# Patient Record
Sex: FEMALE | Race: WHITE | NOT HISPANIC OR LATINO | Employment: FULL TIME | URBAN - METROPOLITAN AREA
[De-identification: names, ages, dates, MRNs, and addresses within clinical notes are randomized per-mention and may not be internally consistent; named-entity substitution may affect disease eponyms.]

---

## 2017-01-03 RX ORDER — TETRACYCLINE HYDROCHLORIDE 500 MG/1
CAPSULE ORAL
Qty: 30 CAPSULE | Refills: 0 | Status: SHIPPED | OUTPATIENT
Start: 2017-01-03 | End: 2017-03-24 | Stop reason: SDUPTHER

## 2017-03-27 RX ORDER — TETRACYCLINE HYDROCHLORIDE 500 MG/1
CAPSULE ORAL
Qty: 30 CAPSULE | Refills: 0 | Status: SHIPPED | OUTPATIENT
Start: 2017-03-27 | End: 2017-05-09

## 2017-05-09 ENCOUNTER — OFFICE VISIT (OUTPATIENT)
Dept: FAMILY MEDICINE | Facility: HOSPITAL | Age: 32
End: 2017-05-09
Attending: FAMILY MEDICINE
Payer: MEDICAID

## 2017-05-09 VITALS
BODY MASS INDEX: 27.1 KG/M2 | SYSTOLIC BLOOD PRESSURE: 117 MMHG | WEIGHT: 147.25 LBS | DIASTOLIC BLOOD PRESSURE: 70 MMHG | HEART RATE: 75 BPM | HEIGHT: 62 IN

## 2017-05-09 DIAGNOSIS — Z79.899 ENCOUNTER FOR LONG-TERM (CURRENT) USE OF MEDICATIONS: ICD-10-CM

## 2017-05-09 DIAGNOSIS — Z83.3 FAMILY HISTORY OF DIABETES MELLITUS: ICD-10-CM

## 2017-05-09 DIAGNOSIS — L70.0 ACNE VULGARIS: Primary | ICD-10-CM

## 2017-05-09 DIAGNOSIS — Z82.3 FAMILY HISTORY OF STROKE: ICD-10-CM

## 2017-05-09 PROCEDURE — 99213 OFFICE O/P EST LOW 20 MIN: CPT | Performed by: FAMILY MEDICINE

## 2017-05-09 RX ORDER — TRETINOIN 0.25 MG/G
GEL TOPICAL DAILY
Qty: 45 G | Refills: 11 | Status: SHIPPED | OUTPATIENT
Start: 2017-05-09

## 2017-05-09 RX ORDER — TETRACYCLINE HYDROCHLORIDE 500 MG/1
500 CAPSULE ORAL 2 TIMES DAILY
Qty: 62 CAPSULE | Refills: 6 | Status: SHIPPED | OUTPATIENT
Start: 2017-05-09

## 2017-05-09 NOTE — PROGRESS NOTES
Patient ID: Oneyda Ohara is a 31 y.o. female.    Chief Complaint: Medication Refill      HPI: Ms. Ohara is a 31 y.o. Female with a PMH of acne, anxiety, and bipolar disorder presenting for acne follow up and refill. She has been out of acne medication for 2 months, patient uses tretinoin gel and tetracycline gel and she reports breakouts since off medications. Patient recently moved back to the area and wants to re-establish care. She reports history of reactive airway disease and does not have albuterol inhaler. She denies SOB, has not been to the ER for asthma exacerbation. Patient does not take medications for bipolar disorder. Reports occasional dysthymia, denies SI/HI, she is able to complete her daily activities.        Social History     Social History    Marital status: Single     Spouse name: N/A    Number of children: N/A    Years of education: N/A     Social History Main Topics    Smoking status: Current Every Day Smoker     Packs/day: 0.50     Types: Cigarettes    Smokeless tobacco: None      Comment: quit due to pregnancy    Alcohol use No      Comment: once a month    Drug use: No      Comment: not currently    Sexual activity: Yes     Partners: Male     Other Topics Concern    None     Social History Narrative       Review of patient's allergies indicates:   Allergen Reactions    Sulfa (sulfonamide antibiotics) Swelling       Current Outpatient Prescriptions   Medication Sig Dispense Refill    tretinoin (RETIN-A) 0.025 % gel Apply topically once daily. Avoid direct sun. May cause dryness , redness.  May need to space every other or 3rd day 45 g 11    tetracycline (ACHROMYCIN,SUMYCIN) 500 MG capsule Take 1 capsule (500 mg total) by mouth 2 (two) times daily. 62 capsule 6     No current facility-administered medications for this visit.          Chemistry        Component Value Date/Time     05/24/2013 1210    K 4.0 05/24/2013 1210     05/24/2013 1210    CO2 22 (L) 05/24/2013  "1210    BUN 6 05/24/2013 1210    CREATININE 0.7 05/24/2013 1210    GLU 83 05/24/2013 1210        Component Value Date/Time    CALCIUM 9.6 05/24/2013 1210    ALKPHOS 41 (L) 05/24/2013 1210    AST 25 05/24/2013 1210    ALT 34 05/24/2013 1210    BILITOT 0.3 05/24/2013 1210            No results found for: TSH    No results found for: CHOL  No results found for: HDL  No results found for: LDLCALC  No results found for: TRIG  No results found for: CHOLHDL    Review of Systems:    General : No fatigue, fever, unexplained weight loss of gain, dizziness or passing out spells    HEENT:  No blurred vision, decreased vision, eye pain, decreased hearing, headaches,  Nosebleeds, or difficulty swallowing    CARDIAC:  No chest pain, palpitations, decreased exercise tolerance.    PULMONARY: No SOB, FLORES, PND, Othopnea, or lower extremity edema:    GI: No nausea, vomiting, diarrhea, constipation, blood in stool or black stools          :  No dysuria , frequency, urgency, loss of urine, blood in urin                 Female: no unusual vaginal bleeding, discharge, pelvic pain or dyspareunia    MUSC/SKEL: No painful swollen joints, decreased ROM in joints, heat or redness in joints    PSYCHIATRIC:  No sadness, tearfulness, loss of interest in things you use to like to do.  No Insomnia, hypersombulance, thoughts of killing self or others    /70  Pulse 75  Ht 5' 2" (1.575 m)  Wt 66.8 kg (147 lb 4.3 oz)  LMP 05/09/2017  BMI 26.94 kg/m2    Physical Exam:    GENERAL: alert, well nourished, well developed, well groomed, appears stated age, in no acute distress    HEENT: normocephalic, atraumatic, PERRLA, fundi benign, disk sharp, no hemorrhages or exudates EOM intact, no scleral icterus  or conjunctival injection:            EARS: Tympanic membrane intact without injection or scars. Good light reflex and landmarks            NOSE: Turbinates normal, no septal deviation, no obstruction of ostia            MOUTH: (Good oral " hygiene)  Moist mucus membranes Observed, without hyperemia, or exudates    NECK: No JVD, bruits or thyromegaly, or lymphadenopathy    CHEST: Good excursion, no rales, rhonchi or wheezes. Clear to auscultation and percussion bilaterally. Normal shape    HEART: Regular rate and rhythm, no murmurs or gallops, or rubs. PMI 5th  ICS    ABDOMEN: Soft, non-tender, non-rigid. No masses or organomegaly appreciated. Good bowel sounds no fluid wave, no rebound or referred tenderness.    EXTREMITIES: Positive pulses, positive reflexes, positive sensory to filament.  No cyanosis, clubbing or deformity.  No edema or ulcers or skin lesions.    NEURO: alert, responsive, CNII-XII intact grossly, motor and sensory intact. Oriented times 4.    PSYCHIATRIC: Mental status normal, mood and affect normal. No suicidal or homicidal ideation. No previous attempts at suicide.    Assessment/Plan:    Health Care Maintenance:  - pap smear UTD, no history of abnormal pap smears  -influenza: will address at future visit    Acne vulgaris  -     CBC auto differential; Future; Expected date: 5/9/17  -     tretinoin (RETIN-A) 0.025 % gel; Apply topically once daily. Avoid direct sun. May cause dryness , redness.  May need to space every other or 3rd day  Dispense: 45 g; Refill: 11  -     tetracycline (ACHROMYCIN,SUMYCIN) 500 MG capsule; Take 1 capsule (500 mg total) by mouth 2 (two) times daily.  Dispense: 62 capsule; Refill: 6    Encounter for long-term (current) use of medications  -     Comprehensive metabolic panel; Future; Expected date: 5/9/17  -     CBC auto differential; Future; Expected date: 5/9/17    Family history of diabetes mellitus  -     Hemoglobin A1c; Future; Expected date: 5/9/17    Family history of stroke  -     Lipid panel; Future; Expected date: 5/9/17      No Follow-up on file.

## 2017-09-12 ENCOUNTER — HOSPITAL ENCOUNTER (EMERGENCY)
Facility: HOSPITAL | Age: 32
Discharge: HOME | End: 2017-09-12
Attending: EMERGENCY MEDICINE
Payer: MEDICAID

## 2017-09-12 VITALS
TEMPERATURE: 99 F | RESPIRATION RATE: 17 BRPM | SYSTOLIC BLOOD PRESSURE: 112 MMHG | OXYGEN SATURATION: 99 % | BODY MASS INDEX: 23.92 KG/M2 | HEIGHT: 62 IN | HEART RATE: 84 BPM | WEIGHT: 130 LBS | DIASTOLIC BLOOD PRESSURE: 79 MMHG

## 2017-09-12 DIAGNOSIS — S20.211A RIB CONTUSION, RIGHT, INITIAL ENCOUNTER: ICD-10-CM

## 2017-09-12 DIAGNOSIS — S16.1XXA CERVICAL STRAIN, ACUTE, INITIAL ENCOUNTER: ICD-10-CM

## 2017-09-12 DIAGNOSIS — S39.012A LUMBAR STRAIN, INITIAL ENCOUNTER: Primary | ICD-10-CM

## 2017-09-12 DIAGNOSIS — T14.90XA TRAUMA: ICD-10-CM
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
## 2017-09-12 PROCEDURE — 63600175 PHARM REV CODE 636 W HCPCS: Performed by: EMERGENCY MEDICINE

## 2017-09-12 PROCEDURE — 99283 EMERGENCY DEPT VISIT LOW MDM: CPT | Mod: 25

## 2017-09-12 PROCEDURE — 25000003 PHARM REV CODE 250: Performed by: EMERGENCY MEDICINE

## 2017-09-12 PROCEDURE — 96372 THER/PROPH/DIAG INJ SC/IM: CPT

## 2017-09-12 RX ORDER — HYDROCODONE BITARTRATE AND ACETAMINOPHEN 5; 325 MG/1; MG/1
1 TABLET ORAL
Status: COMPLETED | OUTPATIENT
Start: 2017-09-12 | End: 2017-09-12

## 2017-09-12 RX ORDER — NAPROXEN 375 MG/1
375 TABLET ORAL 2 TIMES DAILY WITH MEALS
Qty: 60 TABLET | Refills: 0 | Status: SHIPPED | OUTPATIENT
Start: 2017-09-12 | End: 2018-01-06

## 2017-09-12 RX ORDER — CYCLOBENZAPRINE HCL 10 MG
10 TABLET ORAL
Status: COMPLETED | OUTPATIENT
Start: 2017-09-12 | End: 2017-09-12

## 2017-09-12 RX ORDER — CYCLOBENZAPRINE HCL 10 MG
10 TABLET ORAL 3 TIMES DAILY PRN
Qty: 15 TABLET | Refills: 0 | Status: SHIPPED | OUTPATIENT
Start: 2017-09-12 | End: 2017-09-17

## 2017-09-12 RX ORDER — KETOROLAC TROMETHAMINE 30 MG/ML
30 INJECTION, SOLUTION INTRAMUSCULAR; INTRAVENOUS
Status: COMPLETED | OUTPATIENT
Start: 2017-09-12 | End: 2017-09-12

## 2017-09-12 RX ADMIN — CYCLOBENZAPRINE HYDROCHLORIDE 10 MG: 10 TABLET, FILM COATED ORAL at 09:09

## 2017-09-12 RX ADMIN — KETOROLAC TROMETHAMINE 30 MG: 30 INJECTION, SOLUTION INTRAMUSCULAR at 09:09

## 2017-09-12 RX ADMIN — HYDROCODONE BITARTRATE AND ACETAMINOPHEN 1 TABLET: 5; 325 TABLET ORAL at 09:09
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
## 2018-07-31 ENCOUNTER — HOSPITAL ENCOUNTER (EMERGENCY)
Facility: OTHER | Age: 33
Discharge: HOME | End: 2018-07-31
Attending: EMERGENCY MEDICINE
Payer: MEDICAID

## 2018-07-31 VITALS
BODY MASS INDEX: 25.19 KG/M2 | SYSTOLIC BLOOD PRESSURE: 121 MMHG | DIASTOLIC BLOOD PRESSURE: 70 MMHG | RESPIRATION RATE: 16 BRPM | HEIGHT: 62 IN | TEMPERATURE: 98 F | WEIGHT: 136.88 LBS | OXYGEN SATURATION: 99 % | HEART RATE: 80 BPM

## 2018-07-31 DIAGNOSIS — A59.9 TRICHOMONIASIS: Primary | ICD-10-CM

## 2018-07-31 DIAGNOSIS — N89.8 VAGINAL DISCHARGE: ICD-10-CM

## 2018-07-31 LAB
B-HCG UR QL: NEGATIVE
BACTERIA #/AREA URNS HPF: ABNORMAL /HPF
BACTERIA GENITAL QL WET PREP: ABNORMAL
BILIRUB UR QL STRIP: NEGATIVE
CLARITY UR: CLEAR
CLUE CELLS VAG QL WET PREP: ABNORMAL
COLOR UR: YELLOW
CTP QC/QA: YES
FILAMENT FUNGI VAG WET PREP-#/AREA: ABNORMAL
GLUCOSE UR QL STRIP: NEGATIVE
HGB UR QL STRIP: NEGATIVE
KETONES UR QL STRIP: ABNORMAL
LEUKOCYTE ESTERASE UR QL STRIP: ABNORMAL
MICROSCOPIC COMMENT: ABNORMAL
NITRITE UR QL STRIP: NEGATIVE
PH UR STRIP: 6 [PH] (ref 5–8)
PROT UR QL STRIP: NEGATIVE
SP GR UR STRIP: 1.02 (ref 1–1.03)
SPECIMEN SOURCE: ABNORMAL
T VAGINALIS GENITAL QL WET PREP: ABNORMAL
TRICHOMONAS UR QL MICRO: ABNORMAL
URN SPEC COLLECT METH UR: ABNORMAL
UROBILINOGEN UR STRIP-ACNC: NEGATIVE EU/DL
WBC #/AREA URNS HPF: 10 /HPF (ref 0–5)
WBC #/AREA VAG WET PREP: ABNORMAL
YEAST GENITAL QL WET PREP: ABNORMAL

## 2018-07-31 PROCEDURE — 87210 SMEAR WET MOUNT SALINE/INK: CPT

## 2018-07-31 PROCEDURE — 63600175 PHARM REV CODE 636 W HCPCS: Performed by: PHYSICIAN ASSISTANT

## 2018-07-31 PROCEDURE — 96372 THER/PROPH/DIAG INJ SC/IM: CPT

## 2018-07-31 PROCEDURE — 87491 CHLMYD TRACH DNA AMP PROBE: CPT

## 2018-07-31 PROCEDURE — 99284 EMERGENCY DEPT VISIT MOD MDM: CPT | Mod: 25

## 2018-07-31 PROCEDURE — 81000 URINALYSIS NONAUTO W/SCOPE: CPT

## 2018-07-31 PROCEDURE — 81025 URINE PREGNANCY TEST: CPT | Performed by: EMERGENCY MEDICINE

## 2018-07-31 PROCEDURE — 25000003 PHARM REV CODE 250: Performed by: PHYSICIAN ASSISTANT

## 2018-07-31 RX ORDER — AZITHROMYCIN 250 MG/1
1000 TABLET, FILM COATED ORAL
Status: COMPLETED | OUTPATIENT
Start: 2018-07-31 | End: 2018-07-31

## 2018-07-31 RX ORDER — CEFTRIAXONE 250 MG/1
250 INJECTION, POWDER, FOR SOLUTION INTRAMUSCULAR; INTRAVENOUS
Status: COMPLETED | OUTPATIENT
Start: 2018-07-31 | End: 2018-07-31

## 2018-07-31 RX ORDER — METRONIDAZOLE 500 MG/1
500 TABLET ORAL EVERY 12 HOURS
Qty: 14 TABLET | Refills: 0 | Status: SHIPPED | OUTPATIENT
Start: 2018-07-31 | End: 2018-08-07

## 2018-07-31 RX ADMIN — CEFTRIAXONE SODIUM 250 MG: 250 INJECTION, POWDER, FOR SOLUTION INTRAMUSCULAR; INTRAVENOUS at 07:07

## 2018-07-31 RX ADMIN — AZITHROMYCIN 1000 MG: 250 TABLET, FILM COATED ORAL at 07:07
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
## 2018-08-02 LAB
C TRACH DNA SPEC QL NAA+PROBE: NOT DETECTED
N GONORRHOEA DNA SPEC QL NAA+PROBE: NOT DETECTED

## 2018-09-10 ENCOUNTER — OFFICE VISIT (OUTPATIENT)
Dept: URGENT CARE | Facility: CLINIC | Age: 33
End: 2018-09-10
Payer: MEDICAID

## 2018-09-10 VITALS
TEMPERATURE: 99 F | DIASTOLIC BLOOD PRESSURE: 80 MMHG | WEIGHT: 136 LBS | RESPIRATION RATE: 16 BRPM | BODY MASS INDEX: 25.03 KG/M2 | SYSTOLIC BLOOD PRESSURE: 131 MMHG | HEIGHT: 62 IN | OXYGEN SATURATION: 98 % | HEART RATE: 76 BPM

## 2018-09-10 DIAGNOSIS — N89.8 VAGINAL DISCHARGE: Primary | ICD-10-CM

## 2018-09-10 PROCEDURE — 99214 OFFICE O/P EST MOD 30 MIN: CPT | Mod: S$GLB,,, | Performed by: NURSE PRACTITIONER

## 2018-09-10 RX ORDER — METRONIDAZOLE 500 MG/1
500 TABLET ORAL 2 TIMES DAILY
Qty: 14 TABLET | Refills: 0 | Status: SHIPPED | OUTPATIENT
Start: 2018-09-10 | End: 2018-09-17

## 2018-09-10 NOTE — PROGRESS NOTES
"Subjective:       Patient ID: Oneyda Ohara is a 33 y.o. female.    Vitals:  height is 5' 2" (1.575 m) and weight is 61.7 kg (136 lb). Her oral temperature is 99.2 °F (37.3 °C). Her blood pressure is 131/80 and her pulse is 76. Her respiration is 16 and oxygen saturation is 98%.     Chief Complaint: Medication Refill    Patient states she went to the ER on 7/31/2018 for vaginal discharge. Patient states the doctor told her she had trichomoniasis and the provider told patient she would send in an RX for an antibiotic. Patient states she lost the Rx for the antibiotic. Patient states she is here to get another RX for an antibiotic. Patient states the provider did a pelvic exam on her. Patient states she does not want another exam today.      Medication Refill   This is a new problem. Pertinent negatives include no abdominal pain, chest pain, chills, fever, headaches, nausea, rash, sore throat or vomiting.     Review of Systems   Constitution: Negative for chills and fever.   HENT: Negative for sore throat.    Eyes: Negative for blurred vision.   Cardiovascular: Negative for chest pain.   Respiratory: Negative for shortness of breath.    Skin: Negative for rash.   Musculoskeletal: Negative for back pain and joint pain.   Gastrointestinal: Negative for abdominal pain, diarrhea, nausea and vomiting.   Neurological: Negative for headaches.   Psychiatric/Behavioral: The patient is not nervous/anxious.        Objective:      Physical Exam   Constitutional: She is oriented to person, place, and time. She appears well-developed and well-nourished.   HENT:   Head: Normocephalic and atraumatic.   Right Ear: External ear normal.   Left Ear: External ear normal.   Nose: Nose normal.   Eyes: Lids are normal.   Neck: Trachea normal, normal range of motion and phonation normal. Neck supple.   Cardiovascular: Normal pulses.   Pulmonary/Chest: Effort normal.   Abdominal: Soft. Normal appearance and bowel sounds are normal. She " exhibits no distension. There is no tenderness. There is no CVA tenderness.   Genitourinary:   Genitourinary Comments: Patient defers    Neurological: She is alert and oriented to person, place, and time.   Skin: Skin is warm, dry and intact.   Psychiatric: She has a normal mood and affect. Her speech is normal and behavior is normal. Cognition and memory are normal.   Nursing note and vitals reviewed.      Assessment:       1. Vaginal discharge        Plan:       MDM:    Patient was seen on 07/31 at [facility].  She was seen there for vaginal discharge. Treated for gonorrhea and chlamydia and also Trichomonas.  Patient's gonorrhea and chlamydia was negative.  She did not take her Flagyl as prescribed due to losing the prescription.  She is still having mild vaginal discharge. She was requesting a prescription for Flagyl.  She refuses all testing today and does not want another pelvic.  Advised follow-up with her PCP or OBGYN.  Vaginal discharge  -     metroNIDAZOLE (FLAGYL) 500 MG tablet; Take 1 tablet (500 mg total) by mouth 2 (two) times daily. for 7 days  Dispense: 14 tablet; Refill: 0      Patient Instructions       Vaginal Infection: Trichomoniasis     Whether or not he has symptoms, partner will also need to be treated for trich.     Trichomoniasis is often called trich. It is caused by a parasite that is passed during sex. Men with trich often dont have any symptoms. So they dont know that they are infected. In women, it can take weeks or months before symptoms develop.  Symptoms of trichomoniasis  · Foamy gray or yellow-green discharge  · Foul odor  · Intense vaginal itching, burning, redness, and swelling at opening of vagina  · Pain during sex or urination  · Bleeding after sex  Treating trichomoniasis  Trich is treated with antibiotics. Be sure that you:  · Finish all of your medicine. This is true even if your symptoms go away.  · Avoid alcohol until youre done with all your medicine.  · Tell your  partner so that he can seek treatment and be tested for other STDs.  · Avoid sex until you and your partner are both done with treatment.  Why treatment matters  Untreated trich can lead to problems. These include:  · Increased risk of  delivery if you are pregnant  · An abnormal Pap test result  · Possible increased risk of pelvic inflammatory disease (PID)   Date Last Reviewed: 3/1/2017  © 5217-7887 "[employer]". [address] [place] [address], [place] [address]. All rights reserved. This information is not intended as a substitute for professional medical care. Always follow your healthcare professional's instructions.        Vaginal Infection: Bacterial Vaginosis  Both good and bad bacteria are present in a healthy vagina. Bacterial vaginosis (BV) occurs when these bacteria get out of balance. The numbers of good bacteria decrease. This allows the numbers of bad bacteria to increase and cause BV. In most cases, BV is not a serious problem.  Causes of bacterial vaginosis  The cause of BV is not clear. Douching may lead to it. Having sex with a new partner or more than 1 partner makes it more likely.  Symptoms of bacterial vaginosis  Symptoms of BV vary for each woman. Some women have few symptoms or none at all. If symptoms are present, they can include:  · Thin, milky white or gray or sometimes green discharge  · Unpleasant fishy odor  · Irritation, itching, and burning at opening of vagina which may indicate mixed vaginitis    · Burning or irritation with sex or urination which may indicate mixed vaginitis  Diagnosing bacterial vaginosis  Your healthcare provider will ask about your symptoms and health history. He or she will also do a pelvic exam. This is an exam of your vagina and cervix. A sample of vaginal fluid or discharge may be taken. This sample is checked for signs of BV.  Treating bacterial vaginosis  BV is often treated with antibiotics. They may be given in oral pill form or as a  vaginal cream. To use these medicines:  · Be sure to take all of your medicine, even if your symptoms go away.  · If youre taking antibiotic pills, do not drink alcohol until youre finished with all of your medicine.  · If youre using vaginal cream, apply it as directed. Be aware that the cream may make condoms and diaphragms less effective.  · Call your healthcare provider if symptoms do not go away within 4 days of starting treatment. Also call if you have a reaction to the medicine.  Why treatment matters  Even if you have no symptoms or your symptoms are mild, BV should be treated. Untreated BV can lead to health problems such as:  · Increased risk of  delivery if youre pregnant  · Increased risk of complications after surgery on the reproductive organs  · Possible increased risk of pelvic inflammatory disease (PID)   Date Last Reviewed: 3/1/2017  © 3393-3015 [employer]. [address] [place] [address]. All rights reserved. This information is not intended as a substitute for professional medical care. Always follow your healthcare professional's instructions.      -Flagyl for the next 7 days.  -Avoid sexual intercourse while taking.  -Follow up with your PCP or OBGYN.    Please follow up with your Primary care provider within 2-5 days if your signs and symptoms have not resolved or worsen.     If your condition worsens or fails to improve we recommend that you receive another evaluation at the emergency room immediately or contact your primary medical clinic to discuss your concerns.   You must understand that you have received an Urgent Care treatment only and that you may be released before all of your medical problems are known or treated. You, the patient, will arrange for follow up care as instructed.

## 2018-09-10 NOTE — PATIENT INSTRUCTIONS
Vaginal Infection: Trichomoniasis     Whether or not he has symptoms, partner will also need to be treated for trich.     Trichomoniasis is often called trich. It is caused by a parasite that is passed during sex. Men with trich often dont have any symptoms. So they dont know that they are infected. In women, it can take weeks or months before symptoms develop.  Symptoms of trichomoniasis  · Foamy gray or yellow-green discharge  · Foul odor  · Intense vaginal itching, burning, redness, and swelling at opening of vagina  · Pain during sex or urination  · Bleeding after sex  Treating trichomoniasis  Trich is treated with antibiotics. Be sure that you:  · Finish all of your medicine. This is true even if your symptoms go away.  · Avoid alcohol until youre done with all your medicine.  · Tell your partner so that he can seek treatment and be tested for other STDs.  · Avoid sex until you and your partner are both done with treatment.  Why treatment matters  Untreated trich can lead to problems. These include:  · Increased risk of  delivery if you are pregnant  · An abnormal Pap test result  · Possible increased risk of pelvic inflammatory disease (PID)   Date Last Reviewed: 3/1/2017  © 0496-0490 [employer]. [address] [place] [address]. All rights reserved. This information is not intended as a substitute for professional medical care. Always follow your healthcare professional's instructions.        Vaginal Infection: Bacterial Vaginosis  Both good and bad bacteria are present in a healthy vagina. Bacterial vaginosis (BV) occurs when these bacteria get out of balance. The numbers of good bacteria decrease. This allows the numbers of bad bacteria to increase and cause BV. In most cases, BV is not a serious problem.  Causes of bacterial vaginosis  The cause of BV is not clear. Douching may lead to it. Having sex with a new partner or more than 1 partner makes it more  likely.  Symptoms of bacterial vaginosis  Symptoms of BV vary for each woman. Some women have few symptoms or none at all. If symptoms are present, they can include:  · Thin, milky white or gray or sometimes green discharge  · Unpleasant fishy odor  · Irritation, itching, and burning at opening of vagina which may indicate mixed vaginitis    · Burning or irritation with sex or urination which may indicate mixed vaginitis  Diagnosing bacterial vaginosis  Your healthcare provider will ask about your symptoms and health history. He or she will also do a pelvic exam. This is an exam of your vagina and cervix. A sample of vaginal fluid or discharge may be taken. This sample is checked for signs of BV.  Treating bacterial vaginosis  BV is often treated with antibiotics. They may be given in oral pill form or as a vaginal cream. To use these medicines:  · Be sure to take all of your medicine, even if your symptoms go away.  · If youre taking antibiotic pills, do not drink alcohol until youre finished with all of your medicine.  · If youre using vaginal cream, apply it as directed. Be aware that the cream may make condoms and diaphragms less effective.  · Call your healthcare provider if symptoms do not go away within 4 days of starting treatment. Also call if you have a reaction to the medicine.  Why treatment matters  Even if you have no symptoms or your symptoms are mild, BV should be treated. Untreated BV can lead to health problems such as:  · Increased risk of  delivery if youre pregnant  · Increased risk of complications after surgery on the reproductive organs  · Possible increased risk of pelvic inflammatory disease (PID)   Date Last Reviewed: 3/1/2017  © 6943-0333 [employer]. [address] [place] [address], [place] [address]. All rights reserved. This information is not intended as a substitute for professional medical care. Always follow your healthcare professional's  instructions.      -Flagyl for the next 7 days.  -Avoid sexual intercourse while taking.  -Follow up with your PCP or OBGYN.    Please follow up with your Primary care provider within 2-5 days if your signs and symptoms have not resolved or worsen.     If your condition worsens or fails to improve we recommend that you receive another evaluation at the emergency room immediately or contact your primary medical clinic to discuss your concerns.   You must understand that you have received an Urgent Care treatment only and that you may be released before all of your medical problems are known or treated. You, the patient, will arrange for follow up care as instructed.

## 2018-09-13 ENCOUNTER — TELEPHONE (OUTPATIENT)
Dept: URGENT CARE | Facility: CLINIC | Age: 33
End: 2018-09-13

## 2018-09-25 ENCOUNTER — HOSPITAL ENCOUNTER (EMERGENCY)
Facility: HOSPITAL | Age: 33
Discharge: HOME | End: 2018-09-25
Attending: EMERGENCY MEDICINE
Payer: MEDICAID

## 2018-09-25 VITALS
HEART RATE: 113 BPM | WEIGHT: 147 LBS | OXYGEN SATURATION: 100 % | HEIGHT: 62 IN | TEMPERATURE: 98 F | DIASTOLIC BLOOD PRESSURE: 89 MMHG | BODY MASS INDEX: 27.05 KG/M2 | RESPIRATION RATE: 14 BRPM | SYSTOLIC BLOOD PRESSURE: 139 MMHG

## 2018-09-25 DIAGNOSIS — K04.7 DENTAL ABSCESS: Primary | ICD-10-CM

## 2018-09-25 LAB
B-HCG UR QL: NEGATIVE
CTP QC/QA: YES

## 2018-09-25 PROCEDURE — 99283 EMERGENCY DEPT VISIT LOW MDM: CPT

## 2018-09-25 PROCEDURE — 81025 URINE PREGNANCY TEST: CPT | Performed by: PHYSICIAN ASSISTANT

## 2018-09-25 RX ORDER — PENICILLIN V POTASSIUM 500 MG/1
500 TABLET, FILM COATED ORAL 4 TIMES DAILY
Qty: 28 TABLET | Refills: 0 | Status: SHIPPED | OUTPATIENT
Start: 2018-09-25 | End: 2018-10-02

## 2018-09-25 RX ORDER — HYDROCODONE BITARTRATE AND ACETAMINOPHEN 5; 325 MG/1; MG/1
1 TABLET ORAL
Status: DISCONTINUED | OUTPATIENT
Start: 2018-09-25 | End: 2018-09-25 | Stop reason: HOSPADM

## 2018-09-25 RX ORDER — TRAMADOL HYDROCHLORIDE 50 MG/1
50 TABLET ORAL EVERY 6 HOURS PRN
Qty: 10 TABLET | Refills: 0 | Status: SHIPPED | OUTPATIENT
Start: 2018-09-25 | End: 2018-10-05

## 2018-09-25 NOTE — ED TRIAGE NOTES
"patient states L side of mouth has been hurting for the last few days s getting worse; patient states she has been taking BC powder and motrin but nothng is helping the pain now radiates to her ear states " im not sure if its an abcess or what"  "

## 2018-09-25 NOTE — ED PROVIDER NOTES
"Encounter Date: 2018       History     Chief Complaint   Patient presents with    Dental Pain     patient states L side of mouth has been hurting for the last few dyas getting worse; patient states she has been taking BC powder and motrin but nothng is helping the pain now radiates to her ear states " im not sure if its an abcess or what"     Oneyda Ohara, a 33 y.o. female that presents to the ED for evaluation of dental pain that started a couple of days ago.  She states that several months ago, this tooth cracked but she didn't have much pain to this site at that time.  Pain is radiating to her ear.  She states that she has not been able to get in with her dentist d/t work.  Denies any drainage from gums or facial swelling.  Treatments tried include ibuprofen and BC powder with little improvement.            The history is provided by the patient.     Review of patient's allergies indicates:   Allergen Reactions    Sulfa (sulfonamide antibiotics) Swelling     Past Medical History:   Diagnosis Date    Anxiety     Bipolar disorder      Past Surgical History:   Procedure Laterality Date     SECTION, CLASSIC      Surgery for Femur Fracture      TUBAL LIGATION       Family History   Problem Relation Age of Onset    Bipolar disorder Mother     Anxiety disorder Mother     No Known Problems Father     Cancer Neg Hx     Breast cancer Neg Hx     Ovarian cancer Neg Hx      Social History     Tobacco Use    Smoking status: Current Every Day Smoker     Packs/day: 0.50     Types: Cigarettes    Smokeless tobacco: Never Used   Substance Use Topics    Alcohol use: No     Comment: once a month    Drug use: No     Comment: not currently     Review of Systems   Constitutional: Negative for fever.   HENT: Positive for dental problem. Negative for drooling and facial swelling.    Gastrointestinal: Negative for nausea and vomiting.   Skin: Negative for color change.   Allergic/Immunologic: Negative for " immunocompromised state.   Neurological: Negative for facial asymmetry and speech difficulty.   Psychiatric/Behavioral: Negative for agitation.   All other systems reviewed and are negative.      Physical Exam     Initial Vitals [09/25/18 0751]   BP Pulse Resp Temp SpO2   139/89 (!) 113 14 98.4 °F (36.9 °C) 100 %      MAP       --         Physical Exam    Nursing note and vitals reviewed.  Constitutional: She appears well-developed and well-nourished. She appears distressed (crying).   HENT:   Head: Normocephalic and atraumatic.   Right Ear: Hearing, tympanic membrane, external ear and ear canal normal.   Left Ear: Hearing, tympanic membrane, external ear and ear canal normal.   Nose: Nose normal.   Mouth/Throat: Uvula is midline, oropharynx is clear and moist and mucous membranes are normal. No trismus in the jaw. Dental caries present.       Eyes: Conjunctivae and EOM are normal.   Neck: Normal range of motion.   Cardiovascular: Normal rate and regular rhythm.   Pulmonary/Chest: Breath sounds normal.   Musculoskeletal: Normal range of motion.   Neurological: She is alert and oriented to person, place, and time.   Skin: Skin is warm and dry. Capillary refill takes less than 2 seconds.   Psychiatric: She has a normal mood and affect. Thought content normal.         ED Course   Procedures  Labs Reviewed   POCT URINE PREGNANCY          Imaging Results    None          Medical Decision Making:   Initial Assessment:   Dental pain   Differential Diagnosis:   Dental abscess, dental christiano, TMJ, OM, OE  ED Management:  Patient presents for evaluation of lower dental pain without swelling, stridor or difficulty swallowing.  No trismus present.  Severe decay noted to lower teeth at gumline with no drainage or swelling to gums.  Symptoms and exam consistent with dental abscess. No apparent involvement into neck.   [place] offered, patient unable to get ride.  She will be prescribed a course of ABX and tramadol.  She was strongly  encouraged to f/u with dentist for further evaluation and states she will do so today.  Strict return precautions given and patient verbalized understanding.                          Clinical Impression:   The encounter diagnosis was Dental abscess.                             Rere Do PA-C  09/25/18 0961

## 2020-06-15 ENCOUNTER — HOSPITAL ENCOUNTER (EMERGENCY)
Facility: HOSPITAL | Age: 35
Discharge: HOME | End: 2020-06-15
Attending: EMERGENCY MEDICINE
Payer: MEDICAID

## 2020-06-15 VITALS
RESPIRATION RATE: 16 BRPM | SYSTOLIC BLOOD PRESSURE: 134 MMHG | TEMPERATURE: 98 F | HEART RATE: 90 BPM | DIASTOLIC BLOOD PRESSURE: 88 MMHG | OXYGEN SATURATION: 96 %

## 2020-06-15 DIAGNOSIS — G57.02 NEUROPATHY OF LEFT SCIATIC NERVE: Primary | ICD-10-CM

## 2020-06-15 DIAGNOSIS — M25.552 LEFT HIP PAIN: ICD-10-CM

## 2020-06-15 DIAGNOSIS — S70.02XA CONTUSION OF LEFT HIP, INITIAL ENCOUNTER: ICD-10-CM

## 2020-06-15 LAB
ALBUMIN SERPL BCP-MCNC: 3.6 G/DL (ref 3.5–5.2)
ALP SERPL-CCNC: 63 U/L (ref 55–135)
ALT SERPL W/O P-5'-P-CCNC: 155 U/L (ref 10–44)
ANION GAP SERPL CALC-SCNC: 5 MMOL/L (ref 8–16)
AST SERPL-CCNC: 365 U/L (ref 10–40)
BASOPHILS # BLD AUTO: 0.06 K/UL (ref 0–0.2)
BASOPHILS NFR BLD: 0.4 % (ref 0–1.9)
BILIRUB SERPL-MCNC: 0.7 MG/DL (ref 0.1–1)
BUN SERPL-MCNC: 11 MG/DL (ref 6–20)
CALCIUM SERPL-MCNC: 8.8 MG/DL (ref 8.7–10.5)
CHLORIDE SERPL-SCNC: 104 MMOL/L (ref 95–110)
CO2 SERPL-SCNC: 24 MMOL/L (ref 23–29)
CREAT SERPL-MCNC: 0.8 MG/DL (ref 0.5–1.4)
DIFFERENTIAL METHOD: ABNORMAL
EOSINOPHIL # BLD AUTO: 0.2 K/UL (ref 0–0.5)
EOSINOPHIL NFR BLD: 1.4 % (ref 0–8)
ERYTHROCYTE [DISTWIDTH] IN BLOOD BY AUTOMATED COUNT: 12.9 % (ref 11.5–14.5)
EST. GFR  (AFRICAN AMERICAN): >60 ML/MIN/1.73 M^2
EST. GFR  (NON AFRICAN AMERICAN): >60 ML/MIN/1.73 M^2
GLUCOSE SERPL-MCNC: 116 MG/DL (ref 70–110)
HCT VFR BLD AUTO: 49.5 % (ref 37–48.5)
HGB BLD-MCNC: 16 G/DL (ref 12–16)
IMM GRANULOCYTES # BLD AUTO: 0.05 K/UL (ref 0–0.04)
IMM GRANULOCYTES NFR BLD AUTO: 0.3 % (ref 0–0.5)
INR PPP: 0.9 (ref 0.8–1.2)
LYMPHOCYTES # BLD AUTO: 4.1 K/UL (ref 1–4.8)
LYMPHOCYTES NFR BLD: 27.2 % (ref 18–48)
MCH RBC QN AUTO: 28.5 PG (ref 27–31)
MCHC RBC AUTO-ENTMCNC: 32.3 G/DL (ref 32–36)
MCV RBC AUTO: 88 FL (ref 82–98)
MONOCYTES # BLD AUTO: 1.1 K/UL (ref 0.3–1)
MONOCYTES NFR BLD: 6.9 % (ref 4–15)
NEUTROPHILS # BLD AUTO: 9.7 K/UL (ref 1.8–7.7)
NEUTROPHILS NFR BLD: 63.8 % (ref 38–73)
NRBC BLD-RTO: 0 /100 WBC
PLATELET # BLD AUTO: 333 K/UL (ref 150–350)
PMV BLD AUTO: 9.8 FL (ref 9.2–12.9)
POTASSIUM SERPL-SCNC: 3.8 MMOL/L (ref 3.5–5.1)
PROT SERPL-MCNC: 7.6 G/DL (ref 6–8.4)
PROTHROMBIN TIME: 10.4 SEC (ref 9–12.5)
RBC # BLD AUTO: 5.62 M/UL (ref 4–5.4)
SODIUM SERPL-SCNC: 133 MMOL/L (ref 136–145)
WBC # BLD AUTO: 15.16 K/UL (ref 3.9–12.7)

## 2020-06-15 PROCEDURE — 96375 TX/PRO/DX INJ NEW DRUG ADDON: CPT

## 2020-06-15 PROCEDURE — 85025 COMPLETE CBC W/AUTO DIFF WBC: CPT

## 2020-06-15 PROCEDURE — 99285 EMERGENCY DEPT VISIT HI MDM: CPT | Mod: 25

## 2020-06-15 PROCEDURE — 80053 COMPREHEN METABOLIC PANEL: CPT

## 2020-06-15 PROCEDURE — 63600175 PHARM REV CODE 636 W HCPCS: Performed by: EMERGENCY MEDICINE

## 2020-06-15 PROCEDURE — 96374 THER/PROPH/DIAG INJ IV PUSH: CPT

## 2020-06-15 PROCEDURE — 36415 COLL VENOUS BLD VENIPUNCTURE: CPT

## 2020-06-15 PROCEDURE — 85610 PROTHROMBIN TIME: CPT

## 2020-06-15 PROCEDURE — 96376 TX/PRO/DX INJ SAME DRUG ADON: CPT

## 2020-06-15 RX ORDER — MORPHINE SULFATE 2 MG/ML
6 INJECTION, SOLUTION INTRAMUSCULAR; INTRAVENOUS
Status: COMPLETED | OUTPATIENT
Start: 2020-06-15 | End: 2020-06-15

## 2020-06-15 RX ORDER — IBUPROFEN 600 MG/1
600 TABLET ORAL EVERY 6 HOURS PRN
Qty: 20 TABLET | Refills: 0 | Status: SHIPPED | OUTPATIENT
Start: 2020-06-15

## 2020-06-15 RX ORDER — DEXAMETHASONE SODIUM PHOSPHATE 4 MG/ML
12 INJECTION, SOLUTION INTRA-ARTICULAR; INTRALESIONAL; INTRAMUSCULAR; INTRAVENOUS; SOFT TISSUE
Status: COMPLETED | OUTPATIENT
Start: 2020-06-15 | End: 2020-06-15

## 2020-06-15 RX ORDER — METHYLPREDNISOLONE 4 MG/1
TABLET ORAL
Qty: 1 PACKAGE | Refills: 0 | Status: SHIPPED | OUTPATIENT
Start: 2020-06-15 | End: 2020-07-06

## 2020-06-15 RX ADMIN — MORPHINE SULFATE 6 MG: 2 INJECTION, SOLUTION INTRAMUSCULAR; INTRAVENOUS at 02:06

## 2020-06-15 RX ADMIN — MORPHINE SULFATE 6 MG: 2 INJECTION, SOLUTION INTRAMUSCULAR; INTRAVENOUS at 05:06

## 2020-06-15 RX ADMIN — DEXAMETHASONE SODIUM PHOSPHATE 12 MG: 4 INJECTION, SOLUTION INTRAMUSCULAR; INTRAVENOUS at 06:06

## 2020-06-15 NOTE — ED NOTES
Patient is tearful and is asking for medication for pain. Patient has been reminded that she received 6 mg of morphine at 1706. Patient verbalized understanding.

## 2020-06-15 NOTE — DISCHARGE INSTRUCTIONS
You have neuropathy of your left sciatic nerve caused by bruising and swelling from her hip contusion.  Please keep your leg elevated.  I expect your symptoms to slowly return over time.  Please follow-up with Neurology in neuro surgery for further studies and management.

## 2020-06-15 NOTE — ED NOTES
Patient has been updated on plan of care. Patient is resting in bed and is requesting pain medication. Dr. Kraft is aware.

## 2020-06-15 NOTE — ED PROVIDER NOTES
Encounter Date: 6/15/2020       History     Chief Complaint   Patient presents with    Fall     Left hip pain.  No LOC.     HPI   Patient is a 34-year-old woman who presents emergency department for acute onset of left hip pain that occurred last night around midnight when she slipped in the bathtub and fell.  She denies any loss of consciousness.  She states she had sudden onset of left hip pain.  Her friend picked her up and put her in the bed where she slept on and off throughout the night.  This morning she continued to have severe hip pain and noted she could not feel or move her left leg.  She denies any drinking or drug use.  Review of patient's allergies indicates:   Allergen Reactions    Sulfa (sulfonamide antibiotics) Swelling    Clindamycin Anxiety     Past Medical History:   Diagnosis Date    Anxiety     Bipolar disorder     Depression     PTSD (post-traumatic stress disorder)      Past Surgical History:   Procedure Laterality Date     SECTION, CLASSIC      Surgery for Femur Fracture      TUBAL LIGATION       Family History   Problem Relation Age of Onset    Bipolar disorder Mother     Anxiety disorder Mother     No Known Problems Father     Cancer Neg Hx     Breast cancer Neg Hx     Ovarian cancer Neg Hx      Social History     Tobacco Use    Smoking status: Current Every Day Smoker     Packs/day: 0.25     Types: Cigarettes    Smokeless tobacco: Never Used   Substance Use Topics    Alcohol use: No     Comment: once a month    Drug use: No     Comment: not currently     Review of Systems   Constitutional: Negative for fever.   HENT: Negative for sore throat.    Respiratory: Negative for shortness of breath.    Cardiovascular: Negative for chest pain.   Gastrointestinal: Negative for nausea.   Genitourinary: Negative for dysuria.   Musculoskeletal: Negative for back pain.        Left hip pain   Skin: Negative for rash.   Neurological: Positive for weakness and numbness.    Hematological: Does not bruise/bleed easily.       Physical Exam     Initial Vitals [06/15/20 1317]   BP Pulse Resp Temp SpO2   125/75 96 16 97.9 °F (36.6 °C) 96 %      MAP       --         Physical Exam    Constitutional: She appears well-developed and well-nourished.  Non-toxic appearance. She appears distressed (in obvious pain).   HENT:   Head: Normocephalic and atraumatic.   Eyes: EOM are normal. Pupils are equal, round, and reactive to light.   Neck: Normal range of motion. Neck supple. No neck rigidity. No JVD present.   Cardiovascular: Normal rate, regular rhythm, normal heart sounds and intact distal pulses. Exam reveals no gallop and no friction rub.    No murmur heard.  Pulmonary/Chest: Breath sounds normal. She has no wheezes. She has no rhonchi. She has no rales.   Abdominal: Soft. Bowel sounds are normal. She exhibits no distension. There is no abdominal tenderness. There is no rebound and no guarding.   Musculoskeletal: Normal range of motion. Tenderness (left hip) present.      Lumbar back: She exhibits tenderness.   Neurological: She is alert and oriented to person, place, and time. A sensory deficit (unable to feel soft touch or sharpness in left leg. able to feel deep nailbed press. ) is present. No cranial nerve deficit. She exhibits normal muscle tone. Coordination normal. GCS eye subscore is 4. GCS verbal subscore is 5. GCS motor subscore is 6.   Skin: Skin is warm and dry. Capillary refill takes less than 2 seconds.   Psychiatric: She has a normal mood and affect. Her speech is normal and behavior is normal. She is not actively hallucinating.         ED Course   Procedures  Labs Reviewed   CBC W/ AUTO DIFFERENTIAL - Abnormal; Notable for the following components:       Result Value    WBC 15.16 (*)     RBC 5.62 (*)     Hematocrit 49.5 (*)     Gran # (ANC) 9.7 (*)     Immature Grans (Abs) 0.05 (*)     Mono # 1.1 (*)     All other components within normal limits   COMPREHENSIVE METABOLIC PANEL  - Abnormal; Notable for the following components:    Sodium 133 (*)     Glucose 116 (*)      (*)      (*)     Anion Gap 5 (*)     All other components within normal limits   PROTIME-INR          Imaging Results          MRI Lumbar Spine Without Contrast (Final result)  Result time 06/15/20 17:40:00    Final result by Jose Miguel Bray MD (06/15/20 17:40:00)                 Impression:      Small L5-S1 disc protrusion with accompanying annular tear of the disc.  No significant spinal canal or neuroforaminal narrowing.      Electronically signed by: Jose Miguel Bray MD  Date:    06/15/2020  Time:    17:40             Narrative:    EXAMINATION:  MRI LUMBAR SPINE WITHOUT CONTRAST    CLINICAL HISTORY:  Back pain or radiculopathy, trauma;    TECHNIQUE:  Multiplanar, multisequence MR images were acquired from the thoracolumbar junction to the sacrum without the administration of contrast.    COMPARISON:  None.    FINDINGS:  Vertebral body height and alignment are anatomic.  Marrow signal is within normal limits.  The conus terminates at L1.  The distal cord and cauda equina are unremarkable.  There is moderate disc desiccation at the L5-S1 level which demonstrates mild loss of disc height.  A small annular tear of the posterior L5-S1 disc is present accompanied by a very small right paracentral disc protrusion.  There is no significant spinal canal or neuroforaminal narrowing throughout the lumbar spine.                               CT Sacrum Without Contrast (Final result)  Result time 06/15/20 16:20:29    Final result by Eusebio Abel MD (06/15/20 16:20:29)                 Impression:      1. No acute osseous abnormality.  2. Partially imaged intramuscular and soft tissue swelling along the left posterior hemipelvis.  This is nonspecific with some considerations to include contusion, hemorrhage or strain in the setting of trauma.  These findings are in close proximity to the left sciatic nerve  course.      Electronically signed by: Eusebio Abel  Date:    06/15/2020  Time:    16:20             Narrative:    EXAMINATION:  CT SACRUM WITHOUT CONTRAST    CLINICAL HISTORY:  Injury, nerve root, sacral spine;    TECHNIQUE:  Transaxial images through the sacrum without contrast.  Multiplanar reformats    COMPARISON:  None    FINDINGS:  No displaced fracture.  No traumatic malalignment.  Preserved joint spaces.  No hip joint effusion.  Unremarkable noncontrast uterus and urinary bladder.  There is asymmetric enlargement of the left piriformis and gluteus medius musculature with edema in the intervening intermuscular fat planes.  The left sciatic nerve courses through this region.                               CT Lumbar Spine Without Contrast (Final result)  Result time 06/15/20 15:59:03    Final result by Eusebio Abel MD (06/15/20 15:59:03)                 Impression:      No acute osseous abnormality.      Electronically signed by: Eusebio Abel  Date:    06/15/2020  Time:    15:59             Narrative:    EXAMINATION:  CT LUMBAR SPINE WITHOUT CONTRAST    CLINICAL HISTORY:  Back pain or radiculopathy, trauma;    TECHNIQUE:  Low-dose axial, sagittal and coronal reformations are obtained through the lumbar spine.  Contrast was not administered.    COMPARISON:  None.    FINDINGS:  No spondylolisthesis.  No displaced fracture with preserved vertebral body heights.  Minimal degenerative disc disease with marginal spurring at L3-4 and L4-5.  No bony spinal canal or neural foraminal narrowing.  Included intra- abdominal and pelvic structures are unremarkable.                               X-Ray Hip 2 View Left (Final result)  Result time 06/15/20 14:31:08    Final result by Jose Miguel Bray MD (06/15/20 14:31:08)                 Impression:      No acute osseous abnormality.      Electronically signed by: Jose Miguel Bray MD  Date:    06/15/2020  Time:    14:31             Narrative:    EXAMINATION:  XR  HIP 2 VIEW LEFT    CLINICAL HISTORY:  Pain in left hip    TECHNIQUE:  AP view of the pelvis and frog leg lateral view of the left hip were performed.    COMPARISON:  None    FINDINGS:  There is no fracture or dislocation.  The soft tissues are unremarkable.                                 Medical Decision Making:   History:   Old Medical Records: I decided to obtain old medical records.  Initial Assessment:   34-year-old female who presents emergency department complaining of left leg weakness and numbness with associated left hip and lower back pain after falling down in the bathtub last night around midnight. Examination reveals weakness in the left lower extremity as well as sensory deficits with intact reflexes. She has tenderness over the left hip as well X-ray of the left hip obtain the shows no evidence of fracture dislocation. X-ray of the lumbar and sacral region also showed no fracture but the show inflammatory changes that could be consistent with a contusion or hematoma in the area of the sciatic nerveray of the lumbar and sacral region.  I discussed the case with neurosurgery, Dr. Yoo, who suggests emergent MRI of the lumbar spine. This reveals:  Small L5-S1 disc protrusion with accompanying annular tear of the disc.  No significant spinal canal or neuroforaminal narrowing.     DisDiscuss findings with Dr. Yoo. Patient likely has edema and inflammation surrounding the sciatic nerve causing her symptoms. She has to be provided with neurology and neurosurgery follow up, it does not require emergent intervention. She will need an EMG if she is still symptomatic but it will not be performed until six weeks after the injury.  She was given IV Decadron in the ED and provided with crutches, anti-inflammatory pain medications and will be sent home with a Medrol dose pack. Return process discussed and she was discharge improved and gnocchi distress.                   ED Course as of Jun 16 0545 Mon Jun  15, 2020   1543 X-ray left hip shows no acute fracture dislocation.  Re-examination of patient, she is able to feel sharp pinprick on her medial anterior and lateral left thigh, however is still decreased from below the knee.    [AS]   3968 Discussed clinical case, exam and imaging findings with Neurosurgery, Dr. Yoo.  Recommends obtaining stat MRI.    [AS]      ED Course User Index  [AS] Leonel Kraft MD                Clinical Impression:       ICD-10-CM ICD-9-CM   1. Neuropathy of left sciatic nerve  G57.02 355.0   2. Left hip pain  M25.552 719.45   3. Contusion of left hip, initial encounter  S70.02XA 924.01             ED Disposition Condition    Discharge Stable        ED Prescriptions     Medication Sig Dispense Start Date End Date Auth. Provider    methylPREDNISolone (MEDROL DOSEPACK) 4 mg tablet Take as directed 1 Package 6/15/2020 7/6/2020 Leonel Kraft MD    ibuprofen (ADVIL,MOTRIN) 600 MG tablet Take 1 tablet (600 mg total) by mouth every 6 (six) hours as needed for Pain. 20 tablet 6/15/2020  Leonel Kraft MD        Follow-up Information     Follow up With Specialties Details Why Contact Info    Jose Levin MD Vascular Neurology, Neurology Schedule an appointment as soon as possible for a visit   [address] [facility]  NEURO CARE [facility] [address]  [phone]      Cherie Yoo MD Neurosurgery Schedule an appointment as soon as possible for a visit   [address] [facility]  [place]  [address]  [facility] [id number] [phone]      Chica Epps MD Family Medicine Schedule an appointment as soon as possible for a visit   [address]  [address]  [facility] [address]  [phone]      [facility] Emergency Medicine  As needed, If symptoms worsen [address] [facility] [address] [phone]                                     Leonel Kraft MD  06/16/20 0566

## 2020-06-15 NOTE — ED NOTES
Crutch instruction given to patient with return demonstration. Patient reports that she used crutches in the past.

## 2020-06-15 NOTE — ED NOTES
Upon discharge, patient is AAOx4, no cardiac or respiratory complications. Follow up care and  Medications have been reviewed with patient and has been instructed to return to the ER if needed. Patient verbalized understanding and ambulated to the lobby without difficulty. ROGERIO BENOIT.

## 2020-06-15 NOTE — ED NOTES
Oneyda Ohara presents to the ED s/p fall in shower last night at around 2300.  Left leg is shortened and internally rotated.  States that leg has numbness from thigh to foot and is unable to wiggle toes. C/O pain 10/10.  Dorsalis pedis +2 bilaterally.     Mucous membranes are pink and moist. Skin is warm, dry and intact. Lungs are clear bilaterally, respirations are regular and unlabored. Denies SOB, cough, congestion or rhinorrhea. BS active x4, no tenderness with palpation, abd is soft and not distended. Denies any appetite or activity change. S1S2, capillary refill is < 3 seconds. Denies dysuria, difficulty urinating, frequency, tingling or weakness. KAYCEE BEATTY

## 2021-04-15 ENCOUNTER — PATIENT MESSAGE (OUTPATIENT)
Dept: RESEARCH | Facility: HOSPITAL | Age: 36
End: 2021-04-15